# Patient Record
Sex: MALE | Race: ASIAN | URBAN - METROPOLITAN AREA
[De-identification: names, ages, dates, MRNs, and addresses within clinical notes are randomized per-mention and may not be internally consistent; named-entity substitution may affect disease eponyms.]

---

## 2017-03-17 ENCOUNTER — TELEPHONE (OUTPATIENT)
Dept: INTERNAL MEDICINE CLINIC | Facility: CLINIC | Age: 27
End: 2017-03-17

## 2017-03-17 NOTE — TELEPHONE ENCOUNTER
Patient is in office [facility], home for just today from school, needs immunization record form signed by Dr. Mert Sosa. Please advise. Reason for Away Rotations.

## 2017-12-05 ENCOUNTER — TELEPHONE (OUTPATIENT)
Dept: OTHER | Age: 27
End: 2017-12-05

## 2017-12-05 NOTE — TELEPHONE ENCOUNTER
Pt needs to have immunization form signed by doctor, he will drop off form at office today. 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     Please respond to pool: VENESSA ESTRADA LPN/JUDY

## 2017-12-05 NOTE — TELEPHONE ENCOUNTER
Please note reason for call and advise, form placed on [employer] desk for review. Last physical completed on 7/15/16 with Dr. Odalis Mcclendon.

## 2017-12-08 NOTE — TELEPHONE ENCOUNTER
Pt returning call made appt on 12/11 for Tb test. Pt would like to know since this is a two step when he is getting the second shot. Please advise.

## 2017-12-08 NOTE — TELEPHONE ENCOUNTER
Verbal order given by Maru Crowe for 2 step TB test. Please assist patient in a nurse appointment.   Also patient's form is ready for  at the [facility]

## 2017-12-11 NOTE — TELEPHONE ENCOUNTER
Spoke with patient and him to repeat TB test in 1 to 4 weeks. Patient also advised that form is at the nurses station. Form can be returned once TB test is completed. Patient voiced his understanding of this.

## 2023-04-27 ENCOUNTER — OFFICE VISIT (OUTPATIENT)
Dept: DERMATOLOGY CLINIC | Facility: CLINIC | Age: 33
End: 2023-04-27

## 2023-04-27 DIAGNOSIS — L91.8 SKIN TAG: ICD-10-CM

## 2023-04-27 DIAGNOSIS — L81.9 HYPERPIGMENTATION: Primary | ICD-10-CM

## 2023-04-27 DIAGNOSIS — D49.2 NEOPLASM OF UNSPECIFIED BEHAVIOR OF BONE, SOFT TISSUE, AND SKIN: ICD-10-CM

## 2023-04-27 PROCEDURE — 99203 OFFICE O/P NEW LOW 30 MIN: CPT | Performed by: STUDENT IN AN ORGANIZED HEALTH CARE EDUCATION/TRAINING PROGRAM

## 2023-04-27 PROCEDURE — 11102 TANGNTL BX SKIN SINGLE LES: CPT | Performed by: STUDENT IN AN ORGANIZED HEALTH CARE EDUCATION/TRAINING PROGRAM

## 2023-04-27 RX ORDER — LORATADINE 10 MG/1
10 TABLET ORAL DAILY
COMMUNITY

## 2023-04-27 RX ORDER — DEXTROAMPHETAMINE SACCHARATE, AMPHETAMINE ASPARTATE, DEXTROAMPHETAMINE SULFATE AND AMPHETAMINE SULFATE 5; 5; 5; 5 MG/1; MG/1; MG/1; MG/1
1 TABLET ORAL 2 TIMES DAILY
COMMUNITY
Start: 2023-01-25